# Patient Record
Sex: FEMALE | Race: BLACK OR AFRICAN AMERICAN | NOT HISPANIC OR LATINO | Employment: FULL TIME | URBAN - METROPOLITAN AREA
[De-identification: names, ages, dates, MRNs, and addresses within clinical notes are randomized per-mention and may not be internally consistent; named-entity substitution may affect disease eponyms.]

---

## 2017-11-13 PROBLEM — B00.9 HERPES SIMPLEX VIRUS (HSV) INFECTION: Status: ACTIVE | Noted: 2017-11-13

## 2018-06-20 PROBLEM — O99.820 GBS (GROUP B STREPTOCOCCUS CARRIER), +RV CULTURE, CURRENTLY PREGNANT: Status: ACTIVE | Noted: 2018-06-20

## 2019-01-14 PROBLEM — O09.899 SHORT INTERVAL BETWEEN PREGNANCIES AFFECTING PREGNANCY, ANTEPARTUM: Status: ACTIVE | Noted: 2019-01-14

## 2019-01-14 PROBLEM — O99.820 GBS (GROUP B STREPTOCOCCUS CARRIER), +RV CULTURE, CURRENTLY PREGNANT: Status: RESOLVED | Noted: 2018-06-20 | Resolved: 2019-01-14

## 2021-04-15 ENCOUNTER — PATIENT MESSAGE (OUTPATIENT)
Dept: RESEARCH | Facility: HOSPITAL | Age: 36
End: 2021-04-15

## 2022-12-20 ENCOUNTER — OFFICE VISIT (OUTPATIENT)
Dept: FAMILY MEDICINE | Facility: CLINIC | Age: 37
End: 2022-12-20
Payer: MEDICAID

## 2022-12-20 VITALS
BODY MASS INDEX: 36.49 KG/M2 | HEART RATE: 83 BPM | OXYGEN SATURATION: 98 % | HEIGHT: 65 IN | DIASTOLIC BLOOD PRESSURE: 76 MMHG | TEMPERATURE: 99 F | SYSTOLIC BLOOD PRESSURE: 118 MMHG | WEIGHT: 219 LBS

## 2022-12-20 DIAGNOSIS — R05.2 SUBACUTE COUGH: ICD-10-CM

## 2022-12-20 DIAGNOSIS — F32.A DEPRESSION, UNSPECIFIED DEPRESSION TYPE: ICD-10-CM

## 2022-12-20 DIAGNOSIS — J32.9 SINUSITIS, UNSPECIFIED CHRONICITY, UNSPECIFIED LOCATION: ICD-10-CM

## 2022-12-20 DIAGNOSIS — Z00.00 ROUTINE HEALTH MAINTENANCE: Primary | ICD-10-CM

## 2022-12-20 PROCEDURE — 3074F PR MOST RECENT SYSTOLIC BLOOD PRESSURE < 130 MM HG: ICD-10-PCS | Mod: CPTII,,, | Performed by: NURSE PRACTITIONER

## 2022-12-20 PROCEDURE — 99385 PR PREVENTIVE VISIT,NEW,18-39: ICD-10-PCS | Mod: S$PBB,,, | Performed by: NURSE PRACTITIONER

## 2022-12-20 PROCEDURE — 99204 OFFICE O/P NEW MOD 45 MIN: CPT | Performed by: NURSE PRACTITIONER

## 2022-12-20 PROCEDURE — 1159F PR MEDICATION LIST DOCUMENTED IN MEDICAL RECORD: ICD-10-PCS | Mod: CPTII,,, | Performed by: NURSE PRACTITIONER

## 2022-12-20 PROCEDURE — 3078F PR MOST RECENT DIASTOLIC BLOOD PRESSURE < 80 MM HG: ICD-10-PCS | Mod: CPTII,,, | Performed by: NURSE PRACTITIONER

## 2022-12-20 PROCEDURE — 3078F DIAST BP <80 MM HG: CPT | Mod: CPTII,,, | Performed by: NURSE PRACTITIONER

## 2022-12-20 PROCEDURE — 3008F BODY MASS INDEX DOCD: CPT | Mod: CPTII,,, | Performed by: NURSE PRACTITIONER

## 2022-12-20 PROCEDURE — 3074F SYST BP LT 130 MM HG: CPT | Mod: CPTII,,, | Performed by: NURSE PRACTITIONER

## 2022-12-20 PROCEDURE — 99385 PREV VISIT NEW AGE 18-39: CPT | Mod: S$PBB,,, | Performed by: NURSE PRACTITIONER

## 2022-12-20 PROCEDURE — 3008F PR BODY MASS INDEX (BMI) DOCUMENTED: ICD-10-PCS | Mod: CPTII,,, | Performed by: NURSE PRACTITIONER

## 2022-12-20 PROCEDURE — 1159F MED LIST DOCD IN RCRD: CPT | Mod: CPTII,,, | Performed by: NURSE PRACTITIONER

## 2022-12-20 RX ORDER — SERTRALINE HYDROCHLORIDE 50 MG/1
50 TABLET, FILM COATED ORAL
COMMUNITY
Start: 2022-08-11 | End: 2022-12-20 | Stop reason: SDUPTHER

## 2022-12-20 RX ORDER — BENZONATATE 100 MG/1
100 CAPSULE ORAL 3 TIMES DAILY PRN
Qty: 30 CAPSULE | Refills: 0 | Status: SHIPPED | OUTPATIENT
Start: 2022-12-20 | End: 2022-12-30

## 2022-12-20 RX ORDER — DOXYCYCLINE HYCLATE 100 MG
100 TABLET ORAL 2 TIMES DAILY
Qty: 14 TABLET | Refills: 0 | Status: SHIPPED | OUTPATIENT
Start: 2022-12-20 | End: 2022-12-27

## 2022-12-20 RX ORDER — SERTRALINE HYDROCHLORIDE 50 MG/1
50 TABLET, FILM COATED ORAL DAILY
Qty: 30 TABLET | Refills: 11 | Status: SHIPPED | OUTPATIENT
Start: 2022-12-20 | End: 2024-02-26 | Stop reason: SDUPTHER

## 2022-12-20 RX ORDER — PROMETHAZINE HYDROCHLORIDE AND DEXTROMETHORPHAN HYDROBROMIDE 6.25; 15 MG/5ML; MG/5ML
5 SYRUP ORAL EVERY 6 HOURS PRN
Qty: 200 ML | Refills: 0 | Status: SHIPPED | OUTPATIENT
Start: 2022-12-20 | End: 2022-12-30

## 2022-12-20 NOTE — PROGRESS NOTES
SUBJECTIVE:   HPI: Manan Alaniz  is a 37 y.o. female who presents for annual physical.    Ms. Alaniz presents today for annual. She is also here to establish care with me as her PCP. She states she doing well overall at this time but she wanted to have annual blood work done.  She has PMH of depression and HSV. She has 4 children with her youngest being 4 months old. She had pretty uneventful pregnancies. She extensive breast cancer history in family. She did breast feed all of her children but she is no longer breast feeding the baby due to recent illnesses and not wanting to breast feed her while taking antibiotics . She states she has a residual sinus infection after having Covid. She also has a dry lingering cough. She denies fever, chills or other infectious morbidity.     We spent some time reviewing overdue health maintenance. She has had a pap smear done this year that was able to be seen on Care everywhere as it was done in [facility] system.       No visits with results within 6 Month(s) from this visit.   Latest known visit with results is:   Routine Prenatal on 07/29/2019   Component Date Value Ref Range Status    Trichomonas 07/29/2019 NEGATIVE  NEGATIVE Final    Gardnerella vaginalis 07/29/2019 NEGATIVE  NEGATIVE Final    Candida 07/29/2019 POSITIVE (A)  NEGATIVE Final    Gonorrhea 07/29/2019 NEGATIVE  NEGATIVE Final    Chlamydia 07/29/2019 NEGATIVE  NEGATIVE Final    GBS 07/29/2019 NEGATIVE  NEGATIVE Final    Cocaine, Urine (POC) 07/29/2019 Negative  Negative, Indeterminate Final    THC (Marijuana), Urine (POC) 07/29/2019 Negative  Negative, Indeterminate Final    Methadone, Urine (POC) 07/29/2019 Negative  Negative, Indeterminate Final    Opiates, Urine (POC) 07/29/2019 Negative  Negative, Indeterminate Final    Oxycodone, Urine (POC) 07/29/2019 Negative  Negative, Indeterminate Final    MDMA (Ecstasy), Urine (POC) 07/29/2019 Negative  Negative, Indeterminate Final    WBC 07/29/2019 7.2  4.1 -  10.9 K/uL Final    Lymphs 07/29/2019 1.5  0.6 - 4.1 K/uL Final    MCV 07/29/2019 92.5  80.0 - 97.0 fL Final    RBC 07/29/2019 4.1 (L)  4.2 - 6.3 M/uL Final    Lymph % 07/29/2019 21.0  10.0 - 58.5 % Final    MCH 07/29/2019 27.1  26.0 - 32.0 pg Final    Hemoglobin 07/29/2019 11.1 (L)  12.0 - 18.0 g/dL Final    MID 07/29/2019 0.5  0.0 - 1.8 K/uL Final    MCHC 07/29/2019 29.3 (L)  31.0 - 36.0 g/dL Final    Hematocrit 07/29/2019 37.9  37.0 - 51.0 % Final    MID% 07/29/2019 6.7  0.1 - 24.0 % Final    RDW 07/29/2019 15.5 (H)  11.5 - 14.5 % Final    Platelets 07/29/2019 186.0  140.0 - 440.0 K/uL Final    Gran # (ANC) 07/29/2019 5.2  2.0 - 7.8 K/uL Final    Gran % 07/29/2019 72.3  37.0 - 92.0 % Final    MPV 07/29/2019 11.2  0.0 - 99.8 Final    HCV Ab 07/29/2019 NR  NR = NON-REACTIVE Final    Hepatitis B Surface Ag 07/29/2019 NR  NR = NON-REACTIVE Final    HIV-1/HIV-2 Ab 07/29/2019 NR  NON-REACTIVE Final    Syphilis Treponemal Ab 07/29/2019 NR  NON-REACTIVE Final         Current Outpatient Medications on File Prior to Visit   Medication Sig Dispense Refill    valACYclovir (VALTREX) 500 MG tablet Take 1 tablet (500 mg total) by mouth 2 (two) times daily. 60 tablet 5    [DISCONTINUED] sertraline (ZOLOFT) 50 MG tablet Take 50 mg by mouth.      prenatal vit103-iron fum-folic () 27 mg iron- 1 mg Tab Take 1 tablet by mouth once daily. 30 tablet 11    [DISCONTINUED] norethindrone (MICRONOR) 0.35 mg tablet Take 1 tablet (0.35 mg total) by mouth once daily. 30 tablet 1     No current facility-administered medications on file prior to visit.     Past Medical History:   Diagnosis Date    Herpes simplex virus (HSV) infection      @[email or web address]  Past Surgical History:   Procedure Laterality Date    DILATION AND CURETTAGE OF UTERUS       Family History   Problem Relation Age of Onset    Breast cancer Paternal Grandmother     Breast cancer Maternal Grandmother     Breast cancer Cousin     Colon cancer Neg Hx     Ovarian cancer Neg Hx         Marital Status:   Alcohol History:  reports no history of alcohol use.  Tobacco History:  reports that she has never smoked. She has never used smokeless tobacco.  Drug History:  reports no history of drug use.    Health Maintenance Topics with due status: Not Due       Topic Last Completion Date    TETANUS VACCINE 06/02/2022     Immunization History   Administered Date(s) Administered    COVID-19, MRNA, LN-S, PF (Pfizer) (Purple Cap) 03/28/2021, 04/18/2021, 01/18/2022    DTaP 08/22/1991    Hepatitis B, Pediatric/Adolescent 08/08/2000, 09/11/2003, 01/06/2006    IPV 08/22/1991, 01/06/2006    Influenza - Quadrivalent - PF *Preferred* (6 months and older) 11/21/2017    MMR 08/22/1991, 01/06/2006, 09/16/2015    Measles 08/01/1991    Meningococcal Conjugate (MCV4O) 09/16/2015    Tdap 09/16/2015, 06/18/2018, 06/24/2019, 06/02/2022       Review of patient's allergies indicates:  No Known Allergies    Current Outpatient Medications:     valACYclovir (VALTREX) 500 MG tablet, Take 1 tablet (500 mg total) by mouth 2 (two) times daily., Disp: 60 tablet, Rfl: 5    benzonatate (TESSALON) 100 MG capsule, Take 1 capsule (100 mg total) by mouth 3 (three) times daily as needed for Cough., Disp: 30 capsule, Rfl: 0    doxycycline (VIBRA-TABS) 100 MG tablet, Take 1 tablet (100 mg total) by mouth 2 (two) times daily. for 7 days, Disp: 14 tablet, Rfl: 0    prenatal vit103-iron fum-folic () 27 mg iron- 1 mg Tab, Take 1 tablet by mouth once daily., Disp: 30 tablet, Rfl: 11    promethazine-dextromethorphan (PROMETHAZINE-DM) 6.25-15 mg/5 mL Syrp, Take 5 mLs by mouth every 6 (six) hours as needed (Cough)., Disp: 200 mL, Rfl: 0    sertraline (ZOLOFT) 50 MG tablet, Take 1 tablet (50 mg total) by mouth once daily., Disp: 30 tablet, Rfl: 11    Review of Systems   Constitutional:  Negative for activity change, appetite change, fatigue, fever and unexpected weight change.   HENT:  Negative for congestion, hearing loss, mouth  "sores, nosebleeds, postnasal drip, rhinorrhea, sinus pressure, sinus pain, sneezing, sore throat and trouble swallowing.    Eyes:  Negative for pain, discharge, redness, itching and visual disturbance.   Respiratory:  Negative for chest tightness, shortness of breath and wheezing.    Cardiovascular:  Negative for chest pain and palpitations.   Gastrointestinal:  Negative for abdominal pain, blood in stool, constipation, diarrhea, nausea and vomiting.   Endocrine: Negative for cold intolerance, heat intolerance, polydipsia, polyphagia and polyuria.   Genitourinary:  Negative for difficulty urinating, dysuria, flank pain, frequency, genital sores, hematuria, menstrual problem, urgency, vaginal bleeding and vaginal discharge.   Musculoskeletal:  Negative for arthralgias, joint swelling, myalgias and neck pain.   Skin:  Negative for rash and wound.   Allergic/Immunologic: Negative for environmental allergies and food allergies.   Neurological:  Negative for dizziness, weakness, light-headedness and headaches.   Hematological:  Negative for adenopathy. Does not bruise/bleed easily.   Psychiatric/Behavioral:  Negative for agitation, confusion, dysphoric mood, hallucinations, self-injury and sleep disturbance. The patient is not nervous/anxious.     OBJECTIVE:      Vitals:    12/20/22 1430   BP: 118/76   BP Location: Right arm   Patient Position: Sitting   BP Method: Large (Manual)   Pulse: 83   Temp: 98.7 °F (37.1 °C)   TempSrc: Oral   SpO2: 98%   Weight: 99.3 kg (219 lb)   Height: 5' 5" (1.651 m)     Physical Exam  Vitals reviewed.   Constitutional:       General: She is not in acute distress.     Appearance: Normal appearance. She is obese. She is not ill-appearing, toxic-appearing or diaphoretic.      Comments: Patient's BMI 36.44 kg   HENT:      Head: Normocephalic and atraumatic.      Right Ear: Tympanic membrane and external ear normal. There is no impacted cerumen.      Left Ear: Tympanic membrane and external ear " normal. There is no impacted cerumen.      Nose: Nose normal. No congestion or rhinorrhea.      Mouth/Throat:      Mouth: Mucous membranes are moist.      Pharynx: Oropharynx is clear. No oropharyngeal exudate or posterior oropharyngeal erythema.   Eyes:      General: No scleral icterus.        Right eye: No discharge.         Left eye: No discharge.      Extraocular Movements: Extraocular movements intact.      Conjunctiva/sclera: Conjunctivae normal.      Pupils: Pupils are equal, round, and reactive to light.   Neck:      Vascular: No carotid bruit.   Cardiovascular:      Rate and Rhythm: Normal rate and regular rhythm.      Pulses: Normal pulses.      Heart sounds: Normal heart sounds. No murmur heard.    No friction rub. No gallop.   Pulmonary:      Effort: Pulmonary effort is normal. No respiratory distress.      Breath sounds: Normal breath sounds. No stridor. No rales.   Chest:      Chest wall: No tenderness.   Abdominal:      General: Abdomen is flat. Bowel sounds are normal.      Palpations: Abdomen is soft. There is no mass.      Tenderness: There is no abdominal tenderness. There is no guarding.   Musculoskeletal:         General: No swelling or signs of injury. Normal range of motion.      Cervical back: Normal range of motion and neck supple. No rigidity or tenderness.      Right lower leg: No edema.      Left lower leg: No edema.   Skin:     General: Skin is warm and dry.      Capillary Refill: Capillary refill takes less than 2 seconds.      Findings: No bruising, lesion or rash.   Neurological:      General: No focal deficit present.      Mental Status: She is alert and oriented to person, place, and time. Mental status is at baseline.      Motor: No weakness.      Coordination: Coordination normal.   Psychiatric:         Mood and Affect: Mood normal.         Behavior: Behavior normal.         Thought Content: Thought content normal.         Judgment: Judgment normal.      Assessment:       1.  Routine health maintenance    2. Subacute cough    3. Sinusitis, unspecified chronicity, unspecified location    4. Depression, unspecified depression type          Plan:       Routine health maintenance  -     CBC auto differential; Future; Expected date: 12/20/2022  -     Comprehensive Metabolic Panel; Future; Expected date: 12/20/2022  -     TSH; Future; Expected date: 12/20/2022  -     Vitamin D; Future; Expected date: 12/20/2022  -     Lipid Panel; Future; Expected date: 12/20/2022    Subacute cough  -     benzonatate (TESSALON) 100 MG capsule; Take 1 capsule (100 mg total) by mouth 3 (three) times daily as needed for Cough.  Dispense: 30 capsule; Refill: 0  -     X-Ray Chest PA And Lateral; Future; Expected date: 12/20/2022  -     promethazine-dextromethorphan (PROMETHAZINE-DM) 6.25-15 mg/5 mL Syrp; Take 5 mLs by mouth every 6 (six) hours as needed (Cough).  Dispense: 200 mL; Refill: 0    Sinusitis, unspecified chronicity, unspecified location  -     doxycycline (VIBRA-TABS) 100 MG tablet; Take 1 tablet (100 mg total) by mouth 2 (two) times daily. for 7 days  Dispense: 14 tablet; Refill: 0    Depression, unspecified depression type  -     sertraline (ZOLOFT) 50 MG tablet; Take 1 tablet (50 mg total) by mouth once daily.  Dispense: 30 tablet; Refill: 11        Counseled on age and gender appropriate medical preventative services, including cancer screenings, immunizations, overall nutritional health, need for a consistent exercise regimen and an overall push towards maintaining a vigorous and active lifestyle.      Follow up in about 1 year (around 12/20/2023), or if symptoms worsen or fail to improve, for Annual.          12/20/2022 Delfina Spain M.D.    Answers submitted by the patient for this visit:  Review of Systems Questionnaire (Submitted on 12/20/2022)  activity change: No  unexpected weight change: No  neck pain: No  hearing loss: No  rhinorrhea: No  trouble swallowing: No  eye discharge:  No  visual disturbance: No  chest tightness: No  wheezing: No  chest pain: No  palpitations: No  blood in stool: No  constipation: No  vomiting: No  diarrhea: No  polydipsia: No  polyuria: No  difficulty urinating: No  hematuria: No  menstrual problem: No  dysuria: No  joint swelling: No  arthralgias: No  headaches: No  weakness: No  confusion: No  dysphoric mood: No

## 2022-12-21 ENCOUNTER — HOSPITAL ENCOUNTER (OUTPATIENT)
Dept: RADIOLOGY | Facility: HOSPITAL | Age: 37
Discharge: HOME | End: 2022-12-21
Attending: NURSE PRACTITIONER
Payer: MEDICAID

## 2022-12-21 DIAGNOSIS — R05.2 SUBACUTE COUGH: ICD-10-CM

## 2022-12-21 PROCEDURE — 71046 X-RAY EXAM CHEST 2 VIEWS: CPT | Mod: TC,PO

## 2023-02-12 ENCOUNTER — PATIENT MESSAGE (OUTPATIENT)
Dept: FAMILY MEDICINE | Facility: CLINIC | Age: 38
End: 2023-02-12

## 2023-03-10 ENCOUNTER — LAB VISIT (OUTPATIENT)
Dept: LAB | Facility: HOSPITAL | Age: 38
End: 2023-03-10
Attending: NURSE PRACTITIONER
Payer: MEDICAID

## 2023-03-10 ENCOUNTER — OFFICE VISIT (OUTPATIENT)
Dept: FAMILY MEDICINE | Facility: CLINIC | Age: 38
End: 2023-03-10
Payer: MEDICAID

## 2023-03-10 VITALS
HEART RATE: 80 BPM | BODY MASS INDEX: 37.85 KG/M2 | RESPIRATION RATE: 20 BRPM | SYSTOLIC BLOOD PRESSURE: 120 MMHG | TEMPERATURE: 98 F | DIASTOLIC BLOOD PRESSURE: 82 MMHG | WEIGHT: 227.19 LBS | HEIGHT: 65 IN

## 2023-03-10 DIAGNOSIS — Z13.1 DIABETES MELLITUS SCREENING: ICD-10-CM

## 2023-03-10 DIAGNOSIS — E55.9 VITAMIN D DEFICIENCY: Primary | ICD-10-CM

## 2023-03-10 LAB
ESTIMATED AVG GLUCOSE: 128 MG/DL (ref 68–131)
HBA1C MFR BLD: 6.1 % (ref 4.5–6.2)

## 2023-03-10 PROCEDURE — 3074F SYST BP LT 130 MM HG: CPT | Mod: CPTII,,, | Performed by: NURSE PRACTITIONER

## 2023-03-10 PROCEDURE — 3079F PR MOST RECENT DIASTOLIC BLOOD PRESSURE 80-89 MM HG: ICD-10-PCS | Mod: CPTII,,, | Performed by: NURSE PRACTITIONER

## 2023-03-10 PROCEDURE — 36415 COLL VENOUS BLD VENIPUNCTURE: CPT | Performed by: NURSE PRACTITIONER

## 2023-03-10 PROCEDURE — 83036 HEMOGLOBIN GLYCOSYLATED A1C: CPT | Performed by: NURSE PRACTITIONER

## 2023-03-10 PROCEDURE — 1159F PR MEDICATION LIST DOCUMENTED IN MEDICAL RECORD: ICD-10-PCS | Mod: CPTII,,, | Performed by: NURSE PRACTITIONER

## 2023-03-10 PROCEDURE — 3008F BODY MASS INDEX DOCD: CPT | Mod: CPTII,,, | Performed by: NURSE PRACTITIONER

## 2023-03-10 PROCEDURE — 99213 PR OFFICE/OUTPT VISIT, EST, LEVL III, 20-29 MIN: ICD-10-PCS | Mod: S$PBB,,, | Performed by: NURSE PRACTITIONER

## 2023-03-10 PROCEDURE — 3008F PR BODY MASS INDEX (BMI) DOCUMENTED: ICD-10-PCS | Mod: CPTII,,, | Performed by: NURSE PRACTITIONER

## 2023-03-10 PROCEDURE — 3074F PR MOST RECENT SYSTOLIC BLOOD PRESSURE < 130 MM HG: ICD-10-PCS | Mod: CPTII,,, | Performed by: NURSE PRACTITIONER

## 2023-03-10 PROCEDURE — 1159F MED LIST DOCD IN RCRD: CPT | Mod: CPTII,,, | Performed by: NURSE PRACTITIONER

## 2023-03-10 PROCEDURE — 3079F DIAST BP 80-89 MM HG: CPT | Mod: CPTII,,, | Performed by: NURSE PRACTITIONER

## 2023-03-10 PROCEDURE — 99213 OFFICE O/P EST LOW 20 MIN: CPT | Performed by: NURSE PRACTITIONER

## 2023-03-10 PROCEDURE — 99213 OFFICE O/P EST LOW 20 MIN: CPT | Mod: S$PBB,,, | Performed by: NURSE PRACTITIONER

## 2023-03-10 RX ORDER — NITROFURANTOIN 25; 75 MG/1; MG/1
100 CAPSULE ORAL EVERY 12 HOURS
COMMUNITY
Start: 2023-02-20 | End: 2023-03-10 | Stop reason: ALTCHOICE

## 2023-03-10 RX ORDER — ERGOCALCIFEROL 1.25 MG/1
50000 CAPSULE ORAL
Qty: 4 CAPSULE | Refills: 2 | Status: SHIPPED | OUTPATIENT
Start: 2023-03-10 | End: 2023-06-13

## 2023-03-10 RX ORDER — PHENAZOPYRIDINE HYDROCHLORIDE 200 MG/1
200 TABLET, FILM COATED ORAL 3 TIMES DAILY
COMMUNITY
Start: 2023-02-20 | End: 2023-03-10 | Stop reason: ALTCHOICE

## 2023-03-10 NOTE — PROGRESS NOTES
Patient ID: Manan Alaniz is a 37 y.o. female.    Chief Complaint: Obesity (36 yo female here to consult about weight loss. KM)    Ms. Alaniz presents today to discuss weight management. She also wanted to discuss blood work for lab review. She is doing well overall at the time of this visit. She states she has had recent weight gain and after adjusting diet and exercise it has been difficult to lose. She states several family and friends have told her about Ozempic and she wanted to see if she was eligible.    She denies any other issues or complaints.       Past Medical History:   Diagnosis Date    Herpes simplex virus (HSV) infection      Past Surgical History:   Procedure Laterality Date    DILATION AND CURETTAGE OF UTERUS           Tobacco History:  reports that she has never smoked. She has never used smokeless tobacco.      Review of patient's allergies indicates:  No Known Allergies    Current Outpatient Medications:     sertraline (ZOLOFT) 50 MG tablet, Take 1 tablet (50 mg total) by mouth once daily., Disp: 30 tablet, Rfl: 11    valACYclovir (VALTREX) 500 MG tablet, Take 1 tablet (500 mg total) by mouth 2 (two) times daily., Disp: 60 tablet, Rfl: 5    ergocalciferol (ERGOCALCIFEROL) 50,000 unit Cap, Take 1 capsule (50,000 Units total) by mouth every 7 days., Disp: 4 capsule, Rfl: 2    nitrofurantoin, macrocrystal-monohydrate, (MACROBID) 100 MG capsule, Take 100 mg by mouth every 12 (twelve) hours., Disp: , Rfl:     phenazopyridine (PYRIDIUM) 200 MG tablet, Take 200 mg by mouth 3 (three) times daily., Disp: , Rfl:     prenatal vit103-iron fum-folic () 27 mg iron- 1 mg Tab, Take 1 tablet by mouth once daily., Disp: 30 tablet, Rfl: 11    Review of Systems   Constitutional:  Positive for activity change and unexpected weight change. Negative for appetite change, fatigue and fever.   HENT:  Negative for congestion, hearing loss, mouth sores, nosebleeds, postnasal drip, rhinorrhea, sinus pressure,  "sinus pain, sneezing, sore throat and trouble swallowing.    Eyes:  Negative for pain, discharge, redness, itching and visual disturbance.   Respiratory:  Negative for chest tightness, shortness of breath and wheezing.    Cardiovascular:  Negative for chest pain and palpitations.   Gastrointestinal:  Negative for abdominal pain, blood in stool, constipation, diarrhea, nausea and vomiting.   Endocrine: Negative for cold intolerance, heat intolerance, polydipsia, polyphagia and polyuria.   Genitourinary:  Negative for difficulty urinating, dysuria, flank pain, frequency, genital sores, hematuria, menstrual problem, urgency, vaginal bleeding and vaginal discharge.   Musculoskeletal:  Negative for arthralgias, joint swelling, myalgias and neck pain.   Skin:  Negative for rash and wound.   Allergic/Immunologic: Negative for environmental allergies and food allergies.   Neurological:  Negative for dizziness, weakness, light-headedness and headaches.   Hematological:  Negative for adenopathy. Does not bruise/bleed easily.   Psychiatric/Behavioral:  Negative for agitation, confusion, dysphoric mood, hallucinations, self-injury and sleep disturbance. The patient is not nervous/anxious.         Objective:      Vitals:    03/10/23 1016   BP: 120/82   Pulse: 80   Resp: 20   Temp: 97.9 °F (36.6 °C)   TempSrc: Oral   Weight: 103.1 kg (227 lb 3.2 oz)   Height: 5' 5" (1.651 m)     Physical Exam  Vitals reviewed.   Constitutional:       General: She is not in acute distress.     Appearance: Normal appearance. She is obese. She is not ill-appearing, toxic-appearing or diaphoretic.   HENT:      Head: Normocephalic and atraumatic.      Right Ear: Tympanic membrane and external ear normal. There is no impacted cerumen.      Left Ear: Tympanic membrane and external ear normal. There is no impacted cerumen.      Nose: Nose normal. No congestion or rhinorrhea.      Mouth/Throat:      Mouth: Mucous membranes are moist.      Pharynx: " Oropharynx is clear. No oropharyngeal exudate or posterior oropharyngeal erythema.   Eyes:      General: No scleral icterus.        Right eye: No discharge.         Left eye: No discharge.      Extraocular Movements: Extraocular movements intact.      Conjunctiva/sclera: Conjunctivae normal.      Pupils: Pupils are equal, round, and reactive to light.   Neck:      Vascular: No carotid bruit.   Cardiovascular:      Rate and Rhythm: Normal rate and regular rhythm.      Pulses: Normal pulses.      Heart sounds: Normal heart sounds. No murmur heard.    No friction rub. No gallop.   Pulmonary:      Effort: Pulmonary effort is normal. No respiratory distress.      Breath sounds: Normal breath sounds. No stridor. No rales.   Chest:      Chest wall: No tenderness.   Abdominal:      General: Abdomen is flat. Bowel sounds are normal.      Palpations: Abdomen is soft. There is no mass.      Tenderness: There is no abdominal tenderness. There is no guarding.   Musculoskeletal:         General: No swelling or signs of injury. Normal range of motion.      Cervical back: Normal range of motion and neck supple. No rigidity or tenderness.      Right lower leg: No edema.      Left lower leg: No edema.   Skin:     General: Skin is warm and dry.      Capillary Refill: Capillary refill takes less than 2 seconds.      Findings: No bruising, lesion or rash.   Neurological:      General: No focal deficit present.      Mental Status: She is alert and oriented to person, place, and time. Mental status is at baseline.      Motor: No weakness.      Coordination: Coordination normal.   Psychiatric:         Mood and Affect: Mood normal.         Behavior: Behavior normal.         Thought Content: Thought content normal.         Judgment: Judgment normal.         Assessment:       1. Vitamin D deficiency    2. Diabetes mellitus screening           Plan:       Vitamin D deficiency  -     ergocalciferol (ERGOCALCIFEROL) 50,000 unit Cap; Take 1  capsule (50,000 Units total) by mouth every 7 days.  Dispense: 4 capsule; Refill: 2    Diabetes mellitus screening  -     Hemoglobin A1C; Future; Expected date: 03/10/2023      Follow up if symptoms worsen or fail to improve.      She was informed that Nurse Practitioners in the [facility] are not currently allowed to prescribe for weight loss. She will follow up with Dr. Uriel Mtz at next available appointment time.     3/10/2023 Delfina Spain NP

## 2023-06-13 DIAGNOSIS — E55.9 VITAMIN D DEFICIENCY: ICD-10-CM

## 2023-06-13 RX ORDER — ERGOCALCIFEROL 1.25 MG/1
CAPSULE ORAL
Qty: 4 CAPSULE | Refills: 2 | Status: SHIPPED | OUTPATIENT
Start: 2023-06-13

## 2023-06-19 ENCOUNTER — PATIENT MESSAGE (OUTPATIENT)
Dept: FAMILY MEDICINE | Facility: CLINIC | Age: 38
End: 2023-06-19

## 2023-07-03 ENCOUNTER — OFFICE VISIT (OUTPATIENT)
Dept: FAMILY MEDICINE | Facility: CLINIC | Age: 38
End: 2023-07-03
Payer: MEDICAID

## 2023-07-03 VITALS
DIASTOLIC BLOOD PRESSURE: 70 MMHG | HEART RATE: 84 BPM | BODY MASS INDEX: 37.82 KG/M2 | OXYGEN SATURATION: 96 % | SYSTOLIC BLOOD PRESSURE: 110 MMHG | RESPIRATION RATE: 18 BRPM | HEIGHT: 65 IN | WEIGHT: 227 LBS

## 2023-07-03 DIAGNOSIS — Z76.89 ENCOUNTER FOR WEIGHT MANAGEMENT: ICD-10-CM

## 2023-07-03 DIAGNOSIS — E66.09 CLASS 2 OBESITY DUE TO EXCESS CALORIES WITHOUT SERIOUS COMORBIDITY WITH BODY MASS INDEX (BMI) OF 37.0 TO 37.9 IN ADULT: Primary | ICD-10-CM

## 2023-07-03 DIAGNOSIS — R73.03 PREDIABETES: ICD-10-CM

## 2023-07-03 DIAGNOSIS — Z71.3 DIETARY COUNSELING AND SURVEILLANCE: ICD-10-CM

## 2023-07-03 PROBLEM — E66.812 CLASS 2 OBESITY DUE TO EXCESS CALORIES WITHOUT SERIOUS COMORBIDITY WITH BODY MASS INDEX (BMI) OF 37.0 TO 37.9 IN ADULT: Status: ACTIVE | Noted: 2023-07-03

## 2023-07-03 PROBLEM — O09.899 SHORT INTERVAL BETWEEN PREGNANCIES AFFECTING PREGNANCY, ANTEPARTUM: Status: RESOLVED | Noted: 2019-01-14 | Resolved: 2023-07-03

## 2023-07-03 PROCEDURE — 3008F PR BODY MASS INDEX (BMI) DOCUMENTED: ICD-10-PCS | Mod: CPTII,,, | Performed by: FAMILY MEDICINE

## 2023-07-03 PROCEDURE — 3044F PR MOST RECENT HEMOGLOBIN A1C LEVEL <7.0%: ICD-10-PCS | Mod: CPTII,,, | Performed by: FAMILY MEDICINE

## 2023-07-03 PROCEDURE — 3078F DIAST BP <80 MM HG: CPT | Mod: CPTII,,, | Performed by: FAMILY MEDICINE

## 2023-07-03 PROCEDURE — 3044F HG A1C LEVEL LT 7.0%: CPT | Mod: CPTII,,, | Performed by: FAMILY MEDICINE

## 2023-07-03 PROCEDURE — 99214 OFFICE O/P EST MOD 30 MIN: CPT | Mod: S$PBB,,, | Performed by: FAMILY MEDICINE

## 2023-07-03 PROCEDURE — 1159F PR MEDICATION LIST DOCUMENTED IN MEDICAL RECORD: ICD-10-PCS | Mod: CPTII,,, | Performed by: FAMILY MEDICINE

## 2023-07-03 PROCEDURE — 99214 OFFICE O/P EST MOD 30 MIN: CPT | Performed by: FAMILY MEDICINE

## 2023-07-03 PROCEDURE — 3074F SYST BP LT 130 MM HG: CPT | Mod: CPTII,,, | Performed by: FAMILY MEDICINE

## 2023-07-03 PROCEDURE — 1159F MED LIST DOCD IN RCRD: CPT | Mod: CPTII,,, | Performed by: FAMILY MEDICINE

## 2023-07-03 PROCEDURE — 99214 PR OFFICE/OUTPT VISIT, EST, LEVL IV, 30-39 MIN: ICD-10-PCS | Mod: S$PBB,,, | Performed by: FAMILY MEDICINE

## 2023-07-03 PROCEDURE — 3074F PR MOST RECENT SYSTOLIC BLOOD PRESSURE < 130 MM HG: ICD-10-PCS | Mod: CPTII,,, | Performed by: FAMILY MEDICINE

## 2023-07-03 PROCEDURE — 3008F BODY MASS INDEX DOCD: CPT | Mod: CPTII,,, | Performed by: FAMILY MEDICINE

## 2023-07-03 PROCEDURE — 3078F PR MOST RECENT DIASTOLIC BLOOD PRESSURE < 80 MM HG: ICD-10-PCS | Mod: CPTII,,, | Performed by: FAMILY MEDICINE

## 2023-07-03 RX ORDER — PHENTERMINE HYDROCHLORIDE 37.5 MG/1
18.75 TABLET ORAL
Qty: 15 TABLET | Refills: 1 | Status: SHIPPED | OUTPATIENT
Start: 2023-07-03

## 2023-07-03 RX ORDER — PHENTERMINE HYDROCHLORIDE 37.5 MG/1
18.75 TABLET ORAL
Qty: 15 TABLET | Refills: 1 | Status: SHIPPED | OUTPATIENT
Start: 2023-07-03 | End: 2023-07-03

## 2023-07-03 NOTE — PROGRESS NOTES
SUBJECTIVE:    Patient ID: Manan Alaniz is a 38 y.o. female.    Chief Complaint: Obesity    HPI    Manan Alaniz is a 38 y.o. F patient of Delfina Spain NP's with a history of Obesity who comes in to discuss weight management. Past medical history is notable for Depression.    Labs: A1c 6.1%, TSH 1.39, TG 39, eGFR >60    Obesogenic meds: ?Zoloft    The patient has struggled with weight issues since her first pregnancy 16+ years ago. She also gained a significant amount of weight while on Depo Provera. In 2015, she successfully got down to 176 lb while using Adipex, but regained it (and more) after stopping the intervention. At other times, has lost inches off her waist while working out with a , but also regained. Her max weight is 227 lb (today).       Dietary habits include   B: does not eat breakfast  S: none  L: Nutrigrain bar around 12 o'clock, picks on other foods as she prepares lunch for her kids  S: popcorn, fruit, chips  D: pot roast, potatoes, celery, and carrots or air-fried chicken thighs, tellez wrapped asparagus, mashed potatoes  S: popcorn, ice cream  O/n: none  SSB: Coke (2-3 per day), cran-grape or apple juice    Exercise habits include: no dedicated time.     History of uncontrolled hypertension? no  History of cardiovascular disease? No   History of glaucoma? no  History of eating disorder? no  History of substance use? Etoh on weekends, 2 drinks or so  History of cholelithiasis? no  History of pancreatitis? no  Personal or family history of Medullary Thyroid Cancer or MEN2? no      ROS:  Denies fevers, chills, or unintentional weight changes.  Denies polydipsia, polyuria, vision changes, or paresthesias  Denies temperature intolerance, palpitations or skipped heartbeats, diarrhea/constipation, or mood changes.    Review of patient's allergies indicates:  No Known Allergies    Current Outpatient Medications:     ergocalciferol (ERGOCALCIFEROL) 50,000 unit Cap, TAKE 1  "CAPSULE BY MOUTH EVERY 7 DAYS, Disp: 4 capsule, Rfl: 2    sertraline (ZOLOFT) 50 MG tablet, Take 1 tablet (50 mg total) by mouth once daily., Disp: 30 tablet, Rfl: 11    valACYclovir (VALTREX) 500 MG tablet, Take 1 tablet (500 mg total) by mouth 2 (two) times daily., Disp: 60 tablet, Rfl: 5    phentermine (ADIPEX-P) 37.5 mg tablet, Take 0.5 tablets (18.75 mg total) by mouth before breakfast., Disp: 15 tablet, Rfl: 1    prenatal vit103-iron fum-folic () 27 mg iron- 1 mg Tab, Take 1 tablet by mouth once daily., Disp: 30 tablet, Rfl: 11           Objective:      Vitals:    07/03/23 1107   BP: 110/70   Pulse: 84   Resp: 18   SpO2: 96%   Weight: 103 kg (227 lb)   Height: 5' 5" (1.651 m)       Physical Exam  Vitals reviewed.   Constitutional:       General: She is not in acute distress.     Appearance: She is obese. She is not ill-appearing.   Cardiovascular:      Rate and Rhythm: Normal rate and regular rhythm.      Pulses: Normal pulses.      Heart sounds: Normal heart sounds.   Pulmonary:      Effort: Pulmonary effort is normal.      Breath sounds: Normal breath sounds.   Skin:     General: Skin is warm and dry.   Neurological:      Mental Status: She is alert and oriented to person, place, and time.   Psychiatric:         Mood and Affect: Mood normal.         Behavior: Behavior normal.        No visits with results within 1 Month(s) from this visit.   Latest known visit with results is:   Lab Visit on 03/10/2023   Component Date Value    Hemoglobin A1C 03/10/2023 6.1     Estimated Avg Glucose 03/10/2023 128               Assessment & Plan:       1. Class 2 obesity due to excess calories without serious comorbidity with body mass index (BMI) of 37.0 to 37.9 in adult    2. Encounter for weight management    3. Dietary counseling and surveillance    4. Prediabetes        Clinical picture consistent with Obesity due to excessive caloric intake, Class 2. Patient does not meet criteria for bariatric surgery (age " 18-63yo and BMI greater than 40, or greater than 35 with an obesity related health condition). Medication options discussed include Wegovy, Phentermine, Topiramate, Contrave, Xenical, and Plenity. Pt is a candidate for all AOM except Contrave given her EtOH use. Though a good candidate for Wegovy, her insurance does not cover and it is cost-prohibitive. Decision to start Phentermine at 18.75mg per day with room for uptitration and/or augmentation with Topiramate prn. May also consider adding Metformin in context of prediabetes during future OV.    Goal weight loss for next visit: 0.5-1 lb per week, with an overall short-term goal of 5-10% loss from initial weight.    Strategies/recommendations discussed with patient include keeping a written diary that tracks all meals, snacks, and beverages, as well as working toward a goal of exercising 150 minutes of moderate aerobic activity or 75 minutes of vigorous aerobic activity per week. Meal planning handout given to guide more healthful choices.      No follow-ups on file.        7/3/2023 Venus Nice M.D.

## 2023-07-03 NOTE — PATIENT INSTRUCTIONS
"Start keeping a written diary that tracks all meals, snacks, and beverages that you consume.  Begin slowly working toward a goal of exercising 150 minutes of moderate aerobic activity or 75 minutes of vigorous aerobic activity per week.   Use the handout "Mediterranean Diet" to help guide more healthful food choices.  Stay hydrated! Drink enough water per day so that your urine runs almost clear.   Take a multivitamin daily.    "

## 2023-07-31 ENCOUNTER — PATIENT MESSAGE (OUTPATIENT)
Dept: RESEARCH | Facility: HOSPITAL | Age: 38
End: 2023-07-31
Payer: MEDICAID

## 2023-08-16 ENCOUNTER — TELEPHONE (OUTPATIENT)
Dept: FAMILY MEDICINE | Facility: CLINIC | Age: 38
End: 2023-08-16

## 2023-12-20 ENCOUNTER — OFFICE VISIT (OUTPATIENT)
Dept: FAMILY MEDICINE | Facility: CLINIC | Age: 38
End: 2023-12-20
Payer: MEDICAID

## 2023-12-20 VITALS
DIASTOLIC BLOOD PRESSURE: 78 MMHG | TEMPERATURE: 98 F | HEART RATE: 100 BPM | RESPIRATION RATE: 20 BRPM | WEIGHT: 225.31 LBS | BODY MASS INDEX: 37.54 KG/M2 | HEIGHT: 65 IN | SYSTOLIC BLOOD PRESSURE: 128 MMHG

## 2023-12-20 DIAGNOSIS — N63.11 MASS OF UPPER OUTER QUADRANT OF RIGHT BREAST: ICD-10-CM

## 2023-12-20 DIAGNOSIS — Z23 NEED FOR VACCINATION: ICD-10-CM

## 2023-12-20 DIAGNOSIS — E55.9 VITAMIN D DEFICIENCY: ICD-10-CM

## 2023-12-20 DIAGNOSIS — R73.03 PREDIABETES: Primary | ICD-10-CM

## 2023-12-20 DIAGNOSIS — E66.09 CLASS 2 OBESITY DUE TO EXCESS CALORIES WITHOUT SERIOUS COMORBIDITY WITH BODY MASS INDEX (BMI) OF 37.0 TO 37.9 IN ADULT: ICD-10-CM

## 2023-12-20 PROCEDURE — 1159F PR MEDICATION LIST DOCUMENTED IN MEDICAL RECORD: ICD-10-PCS | Mod: CPTII,,, | Performed by: NURSE PRACTITIONER

## 2023-12-20 PROCEDURE — 1159F MED LIST DOCD IN RCRD: CPT | Mod: CPTII,,, | Performed by: NURSE PRACTITIONER

## 2023-12-20 PROCEDURE — 99213 OFFICE O/P EST LOW 20 MIN: CPT | Mod: 25,,, | Performed by: NURSE PRACTITIONER

## 2023-12-20 PROCEDURE — 3074F PR MOST RECENT SYSTOLIC BLOOD PRESSURE < 130 MM HG: ICD-10-PCS | Mod: CPTII,,, | Performed by: NURSE PRACTITIONER

## 2023-12-20 PROCEDURE — 3008F PR BODY MASS INDEX (BMI) DOCUMENTED: ICD-10-PCS | Mod: CPTII,,, | Performed by: NURSE PRACTITIONER

## 2023-12-20 PROCEDURE — 3078F DIAST BP <80 MM HG: CPT | Mod: CPTII,,, | Performed by: NURSE PRACTITIONER

## 2023-12-20 PROCEDURE — 3074F SYST BP LT 130 MM HG: CPT | Mod: CPTII,,, | Performed by: NURSE PRACTITIONER

## 2023-12-20 PROCEDURE — 3008F BODY MASS INDEX DOCD: CPT | Mod: CPTII,,, | Performed by: NURSE PRACTITIONER

## 2023-12-20 PROCEDURE — 90471 FLU VACCINE (QUAD) GREATER THAN OR EQUAL TO 3YO PRESERVATIVE FREE IM: ICD-10-PCS | Mod: ,,, | Performed by: NURSE PRACTITIONER

## 2023-12-20 PROCEDURE — 90686 IIV4 VACC NO PRSV 0.5 ML IM: CPT | Mod: ,,, | Performed by: NURSE PRACTITIONER

## 2023-12-20 PROCEDURE — 90686 FLU VACCINE (QUAD) GREATER THAN OR EQUAL TO 3YO PRESERVATIVE FREE IM: ICD-10-PCS | Mod: ,,, | Performed by: NURSE PRACTITIONER

## 2023-12-20 PROCEDURE — 3044F PR MOST RECENT HEMOGLOBIN A1C LEVEL <7.0%: ICD-10-PCS | Mod: CPTII,,, | Performed by: NURSE PRACTITIONER

## 2023-12-20 PROCEDURE — 3044F HG A1C LEVEL LT 7.0%: CPT | Mod: CPTII,,, | Performed by: NURSE PRACTITIONER

## 2023-12-20 PROCEDURE — 99213 PR OFFICE/OUTPT VISIT, EST, LEVL III, 20-29 MIN: ICD-10-PCS | Mod: 25,,, | Performed by: NURSE PRACTITIONER

## 2023-12-20 PROCEDURE — 90471 IMMUNIZATION ADMIN: CPT | Mod: ,,, | Performed by: NURSE PRACTITIONER

## 2023-12-20 PROCEDURE — 3078F PR MOST RECENT DIASTOLIC BLOOD PRESSURE < 80 MM HG: ICD-10-PCS | Mod: CPTII,,, | Performed by: NURSE PRACTITIONER

## 2023-12-20 RX ORDER — AMOXICILLIN AND CLAVULANATE POTASSIUM 875; 125 MG/1; MG/1
1 TABLET, FILM COATED ORAL 2 TIMES DAILY
COMMUNITY
Start: 2023-12-18

## 2023-12-20 RX ORDER — MUPIROCIN 20 MG/G
OINTMENT TOPICAL
COMMUNITY
Start: 2023-12-18

## 2023-12-20 NOTE — PROGRESS NOTES
Patient ID: Manan Alaniz is a 38 y.o. female.    Chief Complaint: Annual Exam (37 yo female here for annual check up. Pt states she was eval/tx at Urgent Care Tuesday and dx with infected middle finger. Pt states WNL symptoms since eval but experienced uncontrolled BM today. KM/)    Ms. Alaniz presents today for follow  up. She states she is doing well overall but she has been experiencing what appears to be a small mass in her right breast that comes and goes and is at times painful. She denies fever or chills so it does not appear to be cystic or infectious. She does not feel the mass now but she is very concerned and would like to have  this evaluated. She is also due to have blood work done. She denies any other issues or complaints.         Past Medical History:   Diagnosis Date    Herpes simplex virus (HSV) infection      Past Surgical History:   Procedure Laterality Date    DILATION AND CURETTAGE OF UTERUS           Tobacco History:  reports that she has never smoked. She has never been exposed to tobacco smoke. She has never used smokeless tobacco.      Review of patient's allergies indicates:  No Known Allergies    Current Outpatient Medications:     amoxicillin-clavulanate 875-125mg (AUGMENTIN) 875-125 mg per tablet, Take 1 tablet by mouth 2 (two) times daily., Disp: , Rfl:     mupirocin (BACTROBAN) 2 % ointment, SMARTSI Application Topical 2-3 Times Daily, Disp: , Rfl:     ergocalciferol (ERGOCALCIFEROL) 50,000 unit Cap, TAKE 1 CAPSULE BY MOUTH EVERY 7 DAYS (Patient not taking: Reported on 2023), Disp: 4 capsule, Rfl: 2    phentermine (ADIPEX-P) 37.5 mg tablet, Take 0.5 tablets (18.75 mg total) by mouth before breakfast. (Patient not taking: Reported on 2023), Disp: 15 tablet, Rfl: 1    prenatal vit103-iron fum-folic () 27 mg iron- 1 mg Tab, Take 1 tablet by mouth once daily., Disp: 30 tablet, Rfl: 11    sertraline (ZOLOFT) 50 MG tablet, Take 1 tablet (50 mg total) by  "mouth once daily. (Patient not taking: Reported on 12/20/2023), Disp: 30 tablet, Rfl: 11    valACYclovir (VALTREX) 500 MG tablet, Take 1 tablet (500 mg total) by mouth 2 (two) times daily. (Patient not taking: Reported on 12/20/2023), Disp: 60 tablet, Rfl: 5    Review of Systems   Constitutional:  Positive for fatigue.   Musculoskeletal:  Positive for arthralgias.   Psychiatric/Behavioral:  The patient is nervous/anxious.           Objective:      Vitals:    12/20/23 1423   BP: 128/78   Pulse: 100   Resp: 20   Temp: 98.2 °F (36.8 °C)   TempSrc: Oral   Weight: 102.2 kg (225 lb 4.8 oz)   Height: 5' 5" (1.651 m)     Physical Exam  Constitutional:       Appearance: She is obese.   HENT:      Mouth/Throat:      Mouth: Mucous membranes are moist.      Pharynx: Oropharynx is clear.   Cardiovascular:      Rate and Rhythm: Normal rate and regular rhythm.      Heart sounds: Normal heart sounds.   Pulmonary:      Effort: Pulmonary effort is normal.      Breath sounds: Normal breath sounds.   Neurological:      Mental Status: She is alert and oriented to person, place, and time.   Psychiatric:         Mood and Affect: Mood normal.         Behavior: Behavior normal.         Thought Content: Thought content normal.         Judgment: Judgment normal.           Assessment:       1. Prediabetes    2. Vitamin D deficiency    3. Class 2 obesity due to excess calories without serious comorbidity with body mass index (BMI) of 37.0 to 37.9 in adult    4. Mass of upper outer quadrant of right breast    5. Need for vaccination           Plan:       Prediabetes  -     HEMOGLOBIN A1C; Future; Expected date: 12/20/2023    Vitamin D deficiency    Class 2 obesity due to excess calories without serious comorbidity with body mass index (BMI) of 37.0 to 37.9 in adult  -     COMPREHENSIVE METABOLIC PANEL; Future; Expected date: 12/20/2023  -     TSH; Future; Expected date: 12/20/2023    Mass of upper outer quadrant of right breast  -     Mammo " Digital Diagnostic Right with Vasu; Future; Expected date: 12/20/2023  -     US Breast Right Limited; Future; Expected date: 12/20/2023    Need for vaccination  -     Influenza - Quadrivalent (PF)      Follow up if symptoms worsen or fail to improve.        12/20/2023 Delfina Spain, NP

## 2023-12-21 LAB
ALBUMIN SERPL-MCNC: 4.1 G/DL (ref 3.6–5.1)
ALBUMIN/GLOB SERPL: 1.2 (CALC) (ref 1–2.5)
ALP SERPL-CCNC: 93 U/L (ref 31–125)
ALT SERPL-CCNC: 14 U/L (ref 6–29)
AST SERPL-CCNC: 15 U/L (ref 10–30)
BILIRUB SERPL-MCNC: 0.7 MG/DL (ref 0.2–1.2)
BUN SERPL-MCNC: 8 MG/DL (ref 7–25)
BUN/CREAT SERPL: NORMAL (CALC) (ref 6–22)
CALCIUM SERPL-MCNC: 9.5 MG/DL (ref 8.6–10.2)
CHLORIDE SERPL-SCNC: 103 MMOL/L (ref 98–110)
CO2 SERPL-SCNC: 29 MMOL/L (ref 20–32)
CREAT SERPL-MCNC: 0.78 MG/DL (ref 0.5–0.97)
EGFR: 100 ML/MIN/1.73M2
GLOBULIN SER CALC-MCNC: 3.5 G/DL (CALC) (ref 1.9–3.7)
GLUCOSE SERPL-MCNC: 97 MG/DL (ref 65–139)
HBA1C MFR BLD: 5.9 % OF TOTAL HGB
POTASSIUM SERPL-SCNC: 3.8 MMOL/L (ref 3.5–5.3)
PROT SERPL-MCNC: 7.6 G/DL (ref 6.1–8.1)
SODIUM SERPL-SCNC: 140 MMOL/L (ref 135–146)
TSH SERPL-ACNC: 1.34 MIU/L

## 2024-02-26 DIAGNOSIS — F32.A DEPRESSION, UNSPECIFIED DEPRESSION TYPE: ICD-10-CM

## 2024-02-27 RX ORDER — SERTRALINE HYDROCHLORIDE 50 MG/1
50 TABLET, FILM COATED ORAL DAILY
Qty: 30 TABLET | Refills: 11 | Status: SHIPPED | OUTPATIENT
Start: 2024-02-27 | End: 2025-02-26

## 2024-03-11 ENCOUNTER — HOSPITAL ENCOUNTER (OUTPATIENT)
Dept: RADIOLOGY | Facility: HOSPITAL | Age: 39
Discharge: HOME | End: 2024-03-11
Attending: NURSE PRACTITIONER
Payer: MEDICAID

## 2024-03-11 VITALS — BODY MASS INDEX: 37.54 KG/M2 | WEIGHT: 225.31 LBS | HEIGHT: 65 IN

## 2024-03-11 DIAGNOSIS — N63.11 MASS OF UPPER OUTER QUADRANT OF RIGHT BREAST: ICD-10-CM

## 2024-03-11 DIAGNOSIS — R92.8 ABNORMAL MAMMOGRAM: ICD-10-CM

## 2024-03-11 PROCEDURE — 77062 BREAST TOMOSYNTHESIS BI: CPT | Mod: TC,PO

## 2024-06-04 ENCOUNTER — PATIENT MESSAGE (OUTPATIENT)
Dept: FAMILY MEDICINE | Facility: CLINIC | Age: 39
End: 2024-06-04
Payer: MEDICAID

## 2024-06-26 RX ORDER — PENICILLIN V POTASSIUM 500 MG/1
TABLET, FILM COATED ORAL
COMMUNITY
End: 2024-06-27 | Stop reason: ALTCHOICE

## 2024-06-26 RX ORDER — OXYCODONE AND ACETAMINOPHEN 5; 325 MG/1; MG/1
TABLET ORAL
COMMUNITY
End: 2024-06-27 | Stop reason: ALTCHOICE

## 2024-06-26 RX ORDER — TERCONAZOLE 8 MG/G
CREAM VAGINAL
COMMUNITY

## 2024-06-26 RX ORDER — ONDANSETRON 4 MG/1
TABLET, FILM COATED ORAL
COMMUNITY
End: 2024-06-27 | Stop reason: ALTCHOICE

## 2024-06-26 RX ORDER — PROMETHAZINE HYDROCHLORIDE 25 MG/1
TABLET ORAL
COMMUNITY
End: 2024-06-27 | Stop reason: ALTCHOICE

## 2024-06-26 RX ORDER — VALACYCLOVIR HYDROCHLORIDE 1 G/1
TABLET, FILM COATED ORAL
COMMUNITY

## 2024-06-27 ENCOUNTER — OFFICE VISIT (OUTPATIENT)
Dept: FAMILY MEDICINE | Facility: CLINIC | Age: 39
End: 2024-06-27
Payer: MEDICAID

## 2024-06-27 VITALS
HEIGHT: 65 IN | RESPIRATION RATE: 20 BRPM | WEIGHT: 226.63 LBS | HEART RATE: 80 BPM | BODY MASS INDEX: 37.76 KG/M2 | DIASTOLIC BLOOD PRESSURE: 80 MMHG | TEMPERATURE: 98 F | SYSTOLIC BLOOD PRESSURE: 120 MMHG

## 2024-06-27 DIAGNOSIS — B96.89 BV (BACTERIAL VAGINOSIS): ICD-10-CM

## 2024-06-27 DIAGNOSIS — R73.03 PREDIABETES: Primary | ICD-10-CM

## 2024-06-27 DIAGNOSIS — N76.0 BV (BACTERIAL VAGINOSIS): ICD-10-CM

## 2024-06-27 PROCEDURE — 1159F MED LIST DOCD IN RCRD: CPT | Mod: CPTII,,, | Performed by: NURSE PRACTITIONER

## 2024-06-27 PROCEDURE — 3079F DIAST BP 80-89 MM HG: CPT | Mod: CPTII,,, | Performed by: NURSE PRACTITIONER

## 2024-06-27 PROCEDURE — 3074F SYST BP LT 130 MM HG: CPT | Mod: CPTII,,, | Performed by: NURSE PRACTITIONER

## 2024-06-27 PROCEDURE — 99213 OFFICE O/P EST LOW 20 MIN: CPT | Mod: PBBFAC,PN | Performed by: NURSE PRACTITIONER

## 2024-06-27 PROCEDURE — 99213 OFFICE O/P EST LOW 20 MIN: CPT | Mod: S$PBB,,, | Performed by: NURSE PRACTITIONER

## 2024-06-27 PROCEDURE — 3008F BODY MASS INDEX DOCD: CPT | Mod: CPTII,,, | Performed by: NURSE PRACTITIONER

## 2024-06-27 PROCEDURE — 99999 PR PBB SHADOW E&M-EST. PATIENT-LVL III: CPT | Mod: PBBFAC,,, | Performed by: NURSE PRACTITIONER

## 2024-06-27 RX ORDER — BUPROPION HYDROCHLORIDE 150 MG/1
150 TABLET ORAL
COMMUNITY
Start: 2024-03-02

## 2024-06-27 RX ORDER — METRONIDAZOLE 500 MG/1
500 TABLET ORAL EVERY 12 HOURS
Qty: 14 TABLET | Refills: 0 | Status: SHIPPED | OUTPATIENT
Start: 2024-06-27 | End: 2024-07-04

## 2024-06-27 NOTE — PROGRESS NOTES
Patient ID: Manan Alaniz is a 39 y.o. female.    Chief Complaint: Follow-up (38 yo female here for weight recheck. Pt states no c/o. KM)    Ms. Alaniz presents today for follow up. She states she is doing well overall at this time. She has been started on tirzepatide by gynecology recently and she is currently on 2.5 mg. She states she  is tolerating the dose well. She will be titrated up soon. She is concerned because she has not lost weight yet so we discussed diet and exercise.     She states she also has been experiencing vaginal itching and burning. She has introduced several new soaps and lotions as of late and her pH balance is different. She is requesting medication to help.     She denies any acute issues or complaints.     Follow-up  Pertinent negatives include no arthralgias, chest pain, headaches, joint swelling, neck pain, vomiting or weakness.       Past Medical History:   Diagnosis Date    Herpes simplex virus (HSV) infection      Past Surgical History:   Procedure Laterality Date    DILATION AND CURETTAGE OF UTERUS           Tobacco History:  reports that she has never smoked. She has never been exposed to tobacco smoke. She has never used smokeless tobacco.      Review of patient's allergies indicates:  No Known Allergies    Current Outpatient Medications:     buPROPion (WELLBUTRIN XL) 150 MG TB24 tablet, Take 150 mg by mouth., Disp: , Rfl:     ergocalciferol (ERGOCALCIFEROL) 50,000 unit Cap, TAKE 1 CAPSULE BY MOUTH EVERY 7 DAYS, Disp: 4 capsule, Rfl: 2    metroNIDAZOLE (FLAGYL) 500 MG tablet, Take 1 tablet (500 mg total) by mouth every 12 (twelve) hours. for 7 days, Disp: 14 tablet, Rfl: 0    terconazole (TERAZOL 3) 0.8 % vaginal cream, , Disp: , Rfl:     valACYclovir (VALTREX) 1000 MG tablet, , Disp: , Rfl:     Review of Systems   Constitutional:  Negative for activity change and unexpected weight change.   HENT:  Negative for hearing loss, rhinorrhea and trouble swallowing.    Eyes:   "Negative for discharge and visual disturbance.   Respiratory:  Negative for chest tightness and wheezing.    Cardiovascular:  Negative for chest pain and palpitations.   Gastrointestinal:  Negative for blood in stool, constipation, diarrhea and vomiting.   Endocrine: Negative for polydipsia and polyuria.   Genitourinary:  Positive for vaginal pain. Negative for difficulty urinating, dysuria, hematuria and menstrual problem.   Musculoskeletal:  Negative for arthralgias, joint swelling and neck pain.   Neurological:  Negative for weakness and headaches.   Psychiatric/Behavioral:  Negative for confusion and dysphoric mood.           Objective:      Vitals:    06/27/24 1524   BP: 120/80   Pulse: 80   Resp: 20   Temp: 98.2 °F (36.8 °C)   TempSrc: Oral   Weight: 102.8 kg (226 lb 9.6 oz)   Height: 5' 5" (1.651 m)     Physical Exam  Constitutional:       Appearance: Normal appearance.   Cardiovascular:      Rate and Rhythm: Normal rate and regular rhythm.      Pulses: Normal pulses.      Heart sounds: Normal heart sounds.   Pulmonary:      Effort: Pulmonary effort is normal.      Breath sounds: Normal breath sounds.   Abdominal:      Palpations: Abdomen is soft.   Musculoskeletal:         General: Normal range of motion.   Neurological:      Mental Status: She is alert and oriented to person, place, and time.           Assessment:       1. Prediabetes    2. BV (bacterial vaginosis)           Plan:       Prediabetes  -     HEMOGLOBIN A1C; Future; Expected date: 06/27/2024  -     COMPREHENSIVE METABOLIC PANEL; Future; Expected date: 06/27/2024    BV (bacterial vaginosis)  -     metroNIDAZOLE (FLAGYL) 500 MG tablet; Take 1 tablet (500 mg total) by mouth every 12 (twelve) hours. for 7 days  Dispense: 14 tablet; Refill: 0      Follow up in about 6 months (around 12/27/2024).        6/27/2024 Delfina Spain NP      "

## 2024-07-23 ENCOUNTER — PATIENT MESSAGE (OUTPATIENT)
Dept: FAMILY MEDICINE | Facility: CLINIC | Age: 39
End: 2024-07-23
Payer: MEDICAID

## 2025-01-02 ENCOUNTER — PATIENT MESSAGE (OUTPATIENT)
Dept: ADMINISTRATIVE | Facility: HOSPITAL | Age: 40
End: 2025-01-02
Payer: MEDICAID

## 2025-01-03 ENCOUNTER — OFFICE VISIT (OUTPATIENT)
Dept: FAMILY MEDICINE | Facility: CLINIC | Age: 40
End: 2025-01-03
Payer: MEDICAID

## 2025-01-03 ENCOUNTER — LAB VISIT (OUTPATIENT)
Dept: LAB | Facility: HOSPITAL | Age: 40
End: 2025-01-03
Attending: NURSE PRACTITIONER
Payer: MEDICAID

## 2025-01-03 VITALS
HEIGHT: 65 IN | TEMPERATURE: 98 F | DIASTOLIC BLOOD PRESSURE: 84 MMHG | WEIGHT: 224.19 LBS | HEART RATE: 91 BPM | SYSTOLIC BLOOD PRESSURE: 124 MMHG | BODY MASS INDEX: 37.35 KG/M2 | OXYGEN SATURATION: 98 %

## 2025-01-03 DIAGNOSIS — E55.9 VITAMIN D DEFICIENCY: ICD-10-CM

## 2025-01-03 DIAGNOSIS — R73.03 PREDIABETES: Primary | ICD-10-CM

## 2025-01-03 DIAGNOSIS — R73.03 PREDIABETES: ICD-10-CM

## 2025-01-03 DIAGNOSIS — Z00.00 ROUTINE HEALTH MAINTENANCE: ICD-10-CM

## 2025-01-03 LAB
25(OH)D3+25(OH)D2 SERPL-MCNC: 8 NG/ML (ref 30–96)
ALBUMIN SERPL BCP-MCNC: 4 G/DL (ref 3.5–5.2)
ALP SERPL-CCNC: 77 U/L (ref 40–150)
ALT SERPL W/O P-5'-P-CCNC: 17 U/L (ref 10–44)
ANION GAP SERPL CALC-SCNC: 8 MMOL/L (ref 8–16)
AST SERPL-CCNC: 22 U/L (ref 10–40)
BASOPHILS # BLD AUTO: 0.02 K/UL (ref 0–0.2)
BASOPHILS NFR BLD: 0.4 % (ref 0–1.9)
BILIRUB SERPL-MCNC: 1 MG/DL (ref 0.1–1)
BUN SERPL-MCNC: 7 MG/DL (ref 6–20)
CALCIUM SERPL-MCNC: 9.1 MG/DL (ref 8.7–10.5)
CHLORIDE SERPL-SCNC: 107 MMOL/L (ref 95–110)
CO2 SERPL-SCNC: 24 MMOL/L (ref 23–29)
CREAT SERPL-MCNC: 0.8 MG/DL (ref 0.5–1.4)
DIFFERENTIAL METHOD BLD: ABNORMAL
EOSINOPHIL # BLD AUTO: 0.2 K/UL (ref 0–0.5)
EOSINOPHIL NFR BLD: 2.8 % (ref 0–8)
ERYTHROCYTE [DISTWIDTH] IN BLOOD BY AUTOMATED COUNT: 13.5 % (ref 11.5–14.5)
EST. GFR  (NO RACE VARIABLE): >60 ML/MIN/1.73 M^2
ESTIMATED AVG GLUCOSE: 114 MG/DL (ref 68–131)
GLUCOSE SERPL-MCNC: 86 MG/DL (ref 70–110)
HBA1C MFR BLD: 5.6 % (ref 4–5.6)
HCT VFR BLD AUTO: 44.5 % (ref 37–48.5)
HGB BLD-MCNC: 13.4 G/DL (ref 12–16)
IMM GRANULOCYTES # BLD AUTO: 0.02 K/UL (ref 0–0.04)
IMM GRANULOCYTES NFR BLD AUTO: 0.4 % (ref 0–0.5)
LYMPHOCYTES # BLD AUTO: 1.8 K/UL (ref 1–4.8)
LYMPHOCYTES NFR BLD: 32.3 % (ref 18–48)
MCH RBC QN AUTO: 27.6 PG (ref 27–31)
MCHC RBC AUTO-ENTMCNC: 30.1 G/DL (ref 32–36)
MCV RBC AUTO: 92 FL (ref 82–98)
MONOCYTES # BLD AUTO: 0.5 K/UL (ref 0.3–1)
MONOCYTES NFR BLD: 8.5 % (ref 4–15)
NEUTROPHILS # BLD AUTO: 3.1 K/UL (ref 1.8–7.7)
NEUTROPHILS NFR BLD: 55.6 % (ref 38–73)
NRBC BLD-RTO: 0 /100 WBC
PLATELET # BLD AUTO: 219 K/UL (ref 150–450)
PMV BLD AUTO: 11.2 FL (ref 9.2–12.9)
POTASSIUM SERPL-SCNC: 4.1 MMOL/L (ref 3.5–5.1)
PROT SERPL-MCNC: 8.5 G/DL (ref 6–8.4)
RBC # BLD AUTO: 4.86 M/UL (ref 4–5.4)
SODIUM SERPL-SCNC: 139 MMOL/L (ref 136–145)
WBC # BLD AUTO: 5.63 K/UL (ref 3.9–12.7)

## 2025-01-03 PROCEDURE — 99999 PR PBB SHADOW E&M-EST. PATIENT-LVL III: CPT | Mod: PBBFAC,,, | Performed by: NURSE PRACTITIONER

## 2025-01-03 PROCEDURE — 85025 COMPLETE CBC W/AUTO DIFF WBC: CPT | Performed by: NURSE PRACTITIONER

## 2025-01-03 PROCEDURE — 82306 VITAMIN D 25 HYDROXY: CPT | Performed by: NURSE PRACTITIONER

## 2025-01-03 PROCEDURE — 99213 OFFICE O/P EST LOW 20 MIN: CPT | Mod: PBBFAC,PN | Performed by: NURSE PRACTITIONER

## 2025-01-03 PROCEDURE — 36415 COLL VENOUS BLD VENIPUNCTURE: CPT | Performed by: NURSE PRACTITIONER

## 2025-01-03 PROCEDURE — 83036 HEMOGLOBIN GLYCOSYLATED A1C: CPT | Performed by: NURSE PRACTITIONER

## 2025-01-03 PROCEDURE — 80053 COMPREHEN METABOLIC PANEL: CPT | Performed by: NURSE PRACTITIONER

## 2025-01-03 RX ORDER — VALACYCLOVIR HYDROCHLORIDE 500 MG/1
500 TABLET, FILM COATED ORAL 2 TIMES DAILY
COMMUNITY
Start: 2024-11-05

## 2025-01-03 RX ORDER — ERGOCALCIFEROL 1.25 MG/1
50000 CAPSULE ORAL
Qty: 4 CAPSULE | Refills: 2 | Status: SHIPPED | OUTPATIENT
Start: 2025-01-03

## 2025-01-03 NOTE — PROGRESS NOTES
Patient ID: Manan Alaniz is a 39 y.o. female.    Chief Complaint: Follow-up (6 month f/u)    History of Present Illness    CHIEF COMPLAINT:  Ms. Alaniz presents with symptoms of an upper respiratory infection, including a sore throat and possible postnasal drip.    HPI:  Ms. Alaniz reports throat pain, particularly when waking, which improves throughout the day. She describes mild throat discomfort and feeling feverish. Postnasal drip is mentioned. Ms. Alaniz's daughter recently coughed in her face, which may be related to the onset of these symptoms. No specific treatments have been initiated yet. Ms. Alaniz denies ear pain or visible issues with the ears.    MEDICATIONS:  Ms. Alaniz reports having Vitamin D but is not taking it consistently.    MEDICAL HISTORY:  Ms. Alaniz has a history of Polycystic ovary syndrome (PCOS). Ms. Alaniz's last Pap smear was performed by Dr. Sosa. She reports post-procedure bleeding and pain. Her obstetric history includes at least five pregnancies (G5) and five deliveries (P5). She has at least three children, including a 2-year-old, a 5-year-old, and a 6-year-old.    TEST RESULTS:  Ms. Alaniz has a history of low Vitamin D levels. A sleep study has been identified as a potential future need for the patient. She had a past Pap smear performed by Dr. Sosa, after which she reported bleeding and discomfort.    SOCIAL HISTORY:  Ms. Alaniz works as a  at [employer] for 1 year.      ROS:  General: -fever, -chills, -fatigue, -weight gain, -weight loss  Eyes: -vision changes, -redness, -discharge  ENT: -ear pain, -nasal congestion, +sore throat  Cardiovascular: -chest pain, -palpitations, -lower extremity edema  Respiratory: -cough, -shortness of breath  Gastrointestinal: -abdominal pain, -nausea, -vomiting, -diarrhea, -constipation, -blood in stool  Genitourinary: -dysuria, -hematuria, -frequency, -pelvic pain  Musculoskeletal: -joint  "pain, -muscle pain  Skin: -rash, -lesion  Neurological: -headache, -dizziness, -numbness, -tingling  Psychiatric: -anxiety, -depression, -sleep difficulty             Past Medical History:   Diagnosis Date    Herpes simplex virus (HSV) infection      Past Surgical History:   Procedure Laterality Date    DILATION AND CURETTAGE OF UTERUS           Tobacco History:  reports that she has never smoked. She has never been exposed to tobacco smoke. She has never used smokeless tobacco.      Review of patient's allergies indicates:  No Known Allergies    Current Outpatient Medications:     buPROPion (WELLBUTRIN XL) 150 MG TB24 tablet, Take 150 mg by mouth., Disp: , Rfl:     valACYclovir (VALTREX) 500 MG tablet, Take 500 mg by mouth 2 (two) times daily., Disp: , Rfl:     ergocalciferol (ERGOCALCIFEROL) 50,000 unit Cap, Take 1 capsule (50,000 Units total) by mouth every 7 days., Disp: 4 capsule, Rfl: 2           Objective:      Vitals:    01/03/25 1045   BP: 124/84   Pulse: 91   Temp: 98.2 °F (36.8 °C)   TempSrc: Oral   SpO2: 98%   Weight: 101.7 kg (224 lb 3.2 oz)   Height: 5' 5" (1.651 m)     Physical Exam  Constitutional:       Appearance: Normal appearance. She is obese.   Cardiovascular:      Rate and Rhythm: Normal rate and regular rhythm.      Pulses: Normal pulses.      Heart sounds: Normal heart sounds.   Pulmonary:      Effort: Pulmonary effort is normal.      Breath sounds: Normal breath sounds.   Abdominal:      General: Bowel sounds are normal.      Palpations: Abdomen is soft.   Musculoskeletal:         General: Normal range of motion.   Skin:     General: Skin is warm.   Neurological:      Mental Status: She is alert and oriented to person, place, and time. Mental status is at baseline.   Psychiatric:         Mood and Affect: Mood normal.         Behavior: Behavior normal.         Thought Content: Thought content normal.           Assessment:       1. Prediabetes    2. Vitamin D deficiency    3. Routine health " maintenance           Plan:       Assessment & Plan    IMPRESSION:  - Assessed patient's reported upper respiratory symptoms, including sore throat and possible postnasal drip  - Performed physical exam; found no significant abnormalities  - Determined symptoms likely due to viral infection, manageable with OTC treatments  - Reviewed need for vitamin D supplementation  - Considered updating Pap smear results from Dr. Sosa    UPPER RESPIRATORY INFECTION:  - Ms. Alaniz reports coughing.  - Assessed the condition as part of an upper respiratory issue.  - Recommend OTC allergy medication for symptom management.  - Advised the patient to use OTC treatments for the viral issue.  - Ms. Alaniz reports throat pain upon waking up, which improves throughout the day.  - Examined the patient's throat.  - Assessed the condition as part of an upper respiratory issue.  - Recommend OTC treatment for the viral issue.    VITAMIN D DEFICIENCY:  - Ms. Alaniz admits to not taking prescribed Vitamin D.  - Acknowledged the need for Vitamin D supplementation.  - Refilled the Vitamin D supplement prescription.    PAP SMEAR:  - Ms. Alaniz mentions previous Pap smear experience.  - Plan to review previous Pap smear results.    OTHER INSTRUCTIONS:  - [facility] for [facility] medication.         Prediabetes  -     HEMOGLOBIN A1C; Future; Expected date: 01/03/2025    Vitamin D deficiency  -     Vitamin D; Future; Expected date: 01/03/2025  -     ergocalciferol (ERGOCALCIFEROL) 50,000 unit Cap; Take 1 capsule (50,000 Units total) by mouth every 7 days.  Dispense: 4 capsule; Refill: 2    Routine health maintenance  -     CBC W/ AUTO DIFFERENTIAL; Future; Expected date: 01/03/2025  -     COMPREHENSIVE METABOLIC PANEL; Future; Expected date: 01/03/2025  -     HEMOGLOBIN A1C; Future; Expected date: 01/03/2025  -     Vitamin D; Future; Expected date: 01/03/2025      Follow up  in about 6 months (around 7/3/2025).        1/3/2025 Delfina Spain NP    This note was generated with the assistance of ambient listening technology. Verbal consent was obtained by the patient and accompanying visitor(s) for the recording of patient appointment to facilitate this note. I attest to having reviewed and edited the generated note for accuracy, though some syntax or spelling errors may persist. Please contact the author of this note for any clarification.        Answers submitted by the patient for this visit:  Review of Systems Questionnaire (Submitted on 12/31/2024)  activity change: No  unexpected weight change: No  neck pain: No  hearing loss: No  rhinorrhea: No  trouble swallowing: No  eye discharge: No  visual disturbance: No  chest tightness: No  wheezing: No  chest pain: Yes  palpitations: No  blood in stool: No  constipation: No  vomiting: No  diarrhea: No  polydipsia: No  polyuria: Yes  difficulty urinating: No  hematuria: No  menstrual problem: Yes  dysuria: No  joint swelling: No  arthralgias: No  headaches: No  weakness: No  confusion: No  dysphoric mood: No

## 2025-01-06 ENCOUNTER — PATIENT MESSAGE (OUTPATIENT)
Dept: FAMILY MEDICINE | Facility: CLINIC | Age: 40
End: 2025-01-06
Payer: MEDICAID

## 2025-03-07 ENCOUNTER — PATIENT MESSAGE (OUTPATIENT)
Dept: FAMILY MEDICINE | Facility: CLINIC | Age: 40
End: 2025-03-07
Payer: MEDICAID

## 2025-03-10 DIAGNOSIS — R94.31 ABNORMAL EKG: Primary | ICD-10-CM

## 2025-03-14 ENCOUNTER — LAB VISIT (OUTPATIENT)
Dept: LAB | Facility: HOSPITAL | Age: 40
End: 2025-03-14
Attending: NURSE PRACTITIONER
Payer: MEDICAID

## 2025-03-14 DIAGNOSIS — R94.31 ABNORMAL EKG: ICD-10-CM

## 2025-03-14 PROCEDURE — 93005 ELECTROCARDIOGRAM TRACING: CPT | Mod: PO | Performed by: INTERNAL MEDICINE

## 2025-03-14 PROCEDURE — 93010 ELECTROCARDIOGRAM REPORT: CPT | Mod: ,,, | Performed by: INTERNAL MEDICINE

## 2025-03-16 LAB
OHS QRS DURATION: 70 MS
OHS QTC CALCULATION: 428 MS

## 2025-03-17 ENCOUNTER — RESULTS FOLLOW-UP (OUTPATIENT)
Dept: FAMILY MEDICINE | Facility: CLINIC | Age: 40
End: 2025-03-17

## 2025-04-03 ENCOUNTER — PATIENT MESSAGE (OUTPATIENT)
Dept: ADMINISTRATIVE | Facility: HOSPITAL | Age: 40
End: 2025-04-03
Payer: MEDICAID

## 2025-04-11 ENCOUNTER — OFFICE VISIT (OUTPATIENT)
Dept: FAMILY MEDICINE | Facility: CLINIC | Age: 40
End: 2025-04-11
Payer: MEDICAID

## 2025-04-11 VITALS
DIASTOLIC BLOOD PRESSURE: 70 MMHG | HEIGHT: 65 IN | BODY MASS INDEX: 37.47 KG/M2 | TEMPERATURE: 99 F | WEIGHT: 224.88 LBS | RESPIRATION RATE: 20 BRPM | HEART RATE: 80 BPM | SYSTOLIC BLOOD PRESSURE: 126 MMHG

## 2025-04-11 DIAGNOSIS — F32.A ANXIETY AND DEPRESSION: Primary | ICD-10-CM

## 2025-04-11 DIAGNOSIS — F41.9 ANXIETY AND DEPRESSION: Primary | ICD-10-CM

## 2025-04-11 PROCEDURE — 99213 OFFICE O/P EST LOW 20 MIN: CPT | Mod: PBBFAC,PN | Performed by: NURSE PRACTITIONER

## 2025-04-11 PROCEDURE — 99999 PR PBB SHADOW E&M-EST. PATIENT-LVL III: CPT | Mod: PBBFAC,,, | Performed by: NURSE PRACTITIONER

## 2025-04-11 RX ORDER — ESCITALOPRAM OXALATE 10 MG/1
10 TABLET ORAL DAILY
Qty: 90 TABLET | Refills: 3 | Status: SHIPPED | OUTPATIENT
Start: 2025-04-11 | End: 2026-04-11

## 2025-04-11 NOTE — PROGRESS NOTES
Patient ID: Manan Alaniz is a 39 y.o. female.    Chief Complaint: Follow-up (38 yo female here for recheck. Pt states she was eval/tx in Urgent Care times 1 month ago due to chest pain and light headed. Pt states EKG normal. KM)    History of Present Illness    CHIEF COMPLAINT:  Ms. Alaniz presents with anxiety and depression, seeking potential medication adjustment and discussing work-related stress.    HPI:  Ms. Alaniz reports anxiety and depression. 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 She discontinued Wellbutrin about a week ago. Around this time, she had chest pain, shortness of breath, and generally felt unwell, though the direct relationship between these symptoms and stopping the medication is unclear. She was evaluated at an urgent care facility for these symptoms.    Her anxiety is exacerbated by work-related stress and concerns about childcare during the upcoming summer break. She is considering leaving her job due to the challenges of balancing work and childcare responsibilities. She also mentions stress related to a recent family loss, which affected her ability to study for a professional licensing exam.    She has a history of taking Escitalopram for her anxiety and depression, which she reports was effective in the past. She is considering whether she should restart medication to manage her symptoms.    MEDICATIONS:  Ms. Alaniz recently discontinued Wellbutrin and was previously on Escitalopram.    MEDICAL HISTORY:  Ms. Alaniz has a history of anxiety and depression.    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    Marital status:       ROS:  General: -fever, -chills, -fatigue, -weight gain, -weight loss, +malaise  Eyes: -vision changes, -redness, -discharge  ENT: -ear pain, -nasal congestion, -sore throat  Cardiovascular: +chest pain, -palpitations, -lower extremity edema  Respiratory: -cough, +shortness of  "breath  Gastrointestinal: +abdominal pain, -nausea, -vomiting, -diarrhea, -constipation, -blood in stool  Genitourinary: -dysuria, -hematuria, -frequency  Musculoskeletal: -joint pain, -muscle pain  Skin: -rash, -lesion  Neurological: -headache, -dizziness, -numbness, -tingling  Psychiatric: +anxiety, +depression, -sleep difficulty             Past Medical History:   Diagnosis Date    Herpes simplex virus (HSV) infection      Past Surgical History:   Procedure Laterality Date    DILATION AND CURETTAGE OF UTERUS           Tobacco History:  reports that she has never smoked. She has never been exposed to tobacco smoke. She has never used smokeless tobacco.      Review of patient's allergies indicates:  No Known Allergies  Current Medications[1]           Objective:      Vitals:    04/11/25 0904   BP: 126/70   Pulse: 80   Resp: 20   Temp: 98.7 °F (37.1 °C)   TempSrc: Oral   Weight: 102 kg (224 lb 13.9 oz)   Height: 5' 5" (1.651 m)     Physical Exam  Constitutional:       Appearance: Normal appearance. She is obese.   Cardiovascular:      Rate and Rhythm: Normal rate and regular rhythm.      Pulses: Normal pulses.      Heart sounds: Normal heart sounds.   Pulmonary:      Effort: Pulmonary effort is normal.      Breath sounds: Normal breath sounds.   Abdominal:      General: Bowel sounds are normal.      Palpations: Abdomen is soft.   Musculoskeletal:         General: Normal range of motion.   Skin:     General: Skin is warm.   Neurological:      Mental Status: She is alert and oriented to person, place, and time. Mental status is at baseline.   Psychiatric:         Mood and Affect: Mood normal.         Behavior: Behavior normal.         Thought Content: Thought content normal.           Assessment:       1. Anxiety and depression           Plan:       Assessment & Plan    F32.9 Major depressive disorder, single episode, unspecified  F41.9 Anxiety disorder, unspecified  R07.9 Chest pain, unspecified  R06.02 Shortness of " breath  Z56.3 Stressful work schedule  Z56.6 Other physical and mental strain related to work  Z63.0 Problems in relationship with spouse or partner    IMPRESSION:  - Considered restarting antidepressant medication for anxiety and depression, ruled out Wellbutrin due to previous side effects.  - Planning to restart antidepressant medication, potentially escitalopram, at lowest dose (specific dosage to be determined).    DEPRESSION AND ANXIETY:  - Referred the patient to a psychiatrist for evaluation of depression.  - Ms. Alaniz reports feeling anxious and overwhelmed with work and personal responsibilities.  - Noted that the patient stopped taking medication for anxiety and depression.  - Observed signs of stress and anxiety related to work and childcare responsibilities.  - Acknowledged the patient's need for medication and suggested trying a different medication.  - Planned to restart medication and submit a referral for further evaluation.  - Referred the patient to a psychiatrist for evaluation of anxiety.  - Ms. Alaniz reports feeling constantly anxious due to work and personal responsibilities.  - Acknowledged the patient's anxiety and suggested the need for medication.  - Planned to restart medication and submit a referral for further evaluation of anxiety.    CHEST PAIN:  - Ms. Alaniz reports experiencing chest pain in the past.  - Noted that the patient had chest pain along with dyspnea in the past.  - Acknowledged past chest pain but uncertain about its relation to medication.  - Advised the patient to seek urgent care when experiencing chest pain.    SHORTNESS OF BREATH:  - Ms. Alaniz reports experiencing dyspnea in the past.  - Noted that the patient had dyspnea along with chest pain in the past.  - Advised the patient to seek urgent care when experiencing dyspnea.    STRESSFUL WORK SCHEDULE:  - Ms. Alaniz reports having a stressful work schedule with numerous clients and  responsibilities.  - Observed that the patient expresses feeling overwhelmed and anxious due to work responsibilities.  - Acknowledged the patient's stressful work situation and suggested strategizing.  - Recommend considering part-time work or reduced hours to manage stress.  - Scheduled follow-up to discuss next steps if deciding to pursue leave of absence from work.    WORK-RELATED MENTAL STRAIN:  - Ms. Alaniz reports feeling mental strain and anxiety related to work responsibilities.  - Observed signs of mental strain and noted that the patient considers quitting due to work-related stress.  - Acknowledged the mental strain related to work and suggested strategizing to manage it.  - Recommend considering part-time work or reduced hours to manage work-related strain.    RELATIONSHIP ISSUES:  - Ms. Alaniz mentions issues with their partner regarding childcare responsibilities.         Anxiety and depression  -     EScitalopram oxalate (LEXAPRO) 10 MG tablet; Take 1 tablet (10 mg total) by mouth once daily.  Dispense: 90 tablet; Refill: 3  -     Ambulatory referral/consult to Psychiatry; Future; Expected date: 04/18/2025      Follow up in about 6 months (around 10/11/2025).        4/11/2025 Delfina Spain NP    This note was generated with the assistance of ambient listening technology. Verbal consent was obtained by the patient and accompanying visitor(s) for the recording of patient appointment to facilitate this note. I attest to having reviewed and edited the generated note for accuracy, though some syntax or spelling errors may persist. Please contact the author of this note for any clarification.        Answers submitted by the patient for this visit:  Review of Systems Questionnaire (Submitted on 4/10/2025)  activity change: No  unexpected weight change: No  neck pain: No  hearing loss: No  rhinorrhea: No  trouble swallowing: No  eye discharge: No  visual disturbance: No  chest tightness:  No  wheezing: No  chest pain: No  palpitations: No  blood in stool: No  constipation: No  vomiting: No  diarrhea: No  polydipsia: No  polyuria: No  difficulty urinating: No  hematuria: No  menstrual problem: No  dysuria: No  joint swelling: No  arthralgias: No  headaches: No  weakness: No  confusion: No  dysphoric mood: No         [1]   Current Outpatient Medications:     ergocalciferol (ERGOCALCIFEROL) 50,000 unit Cap, Take 1 capsule (50,000 Units total) by mouth every 7 days., Disp: 4 capsule, Rfl: 2    valACYclovir (VALTREX) 500 MG tablet, Take 500 mg by mouth 2 (two) times daily., Disp: , Rfl:     EScitalopram oxalate (LEXAPRO) 10 MG tablet, Take 1 tablet (10 mg total) by mouth once daily., Disp: 90 tablet, Rfl: 3

## 2025-04-14 ENCOUNTER — OFFICE VISIT (OUTPATIENT)
Dept: FAMILY MEDICINE | Facility: CLINIC | Age: 40
End: 2025-04-14
Payer: MEDICAID

## 2025-04-14 VITALS
HEIGHT: 65 IN | TEMPERATURE: 99 F | BODY MASS INDEX: 37.1 KG/M2 | WEIGHT: 222.69 LBS | DIASTOLIC BLOOD PRESSURE: 80 MMHG | SYSTOLIC BLOOD PRESSURE: 128 MMHG | RESPIRATION RATE: 20 BRPM | HEART RATE: 108 BPM

## 2025-04-14 DIAGNOSIS — J02.9 SORE THROAT: ICD-10-CM

## 2025-04-14 DIAGNOSIS — R51.9 GENERALIZED HEADACHES: ICD-10-CM

## 2025-04-14 DIAGNOSIS — R09.81 SINUS CONGESTION: ICD-10-CM

## 2025-04-14 DIAGNOSIS — J02.0 STREP PHARYNGITIS: Primary | ICD-10-CM

## 2025-04-14 LAB
CTP QC/QA: YES
CTP QC/QA: YES
MOLECULAR STREP A: POSITIVE
SARS-COV-2 RDRP RESP QL NAA+PROBE: NEGATIVE

## 2025-04-14 PROCEDURE — 99999PBSHW POCT STREP A MOLECULAR: Mod: PBBFAC,,,

## 2025-04-14 PROCEDURE — 99999 PR PBB SHADOW E&M-EST. PATIENT-LVL III: CPT | Mod: PBBFAC,,, | Performed by: NURSE PRACTITIONER

## 2025-04-14 PROCEDURE — 87651 STREP A DNA AMP PROBE: CPT | Mod: PBBFAC,PN | Performed by: NURSE PRACTITIONER

## 2025-04-14 PROCEDURE — 3079F DIAST BP 80-89 MM HG: CPT | Mod: CPTII,,, | Performed by: NURSE PRACTITIONER

## 2025-04-14 PROCEDURE — 99999PBSHW: Mod: PBBFAC,,,

## 2025-04-14 PROCEDURE — 99213 OFFICE O/P EST LOW 20 MIN: CPT | Mod: PBBFAC,PN | Performed by: NURSE PRACTITIONER

## 2025-04-14 PROCEDURE — 87635 SARS-COV-2 COVID-19 AMP PRB: CPT | Mod: PBBFAC,PN | Performed by: NURSE PRACTITIONER

## 2025-04-14 PROCEDURE — 1159F MED LIST DOCD IN RCRD: CPT | Mod: CPTII,,, | Performed by: NURSE PRACTITIONER

## 2025-04-14 PROCEDURE — 3008F BODY MASS INDEX DOCD: CPT | Mod: CPTII,,, | Performed by: NURSE PRACTITIONER

## 2025-04-14 PROCEDURE — 3044F HG A1C LEVEL LT 7.0%: CPT | Mod: CPTII,,, | Performed by: NURSE PRACTITIONER

## 2025-04-14 PROCEDURE — 99213 OFFICE O/P EST LOW 20 MIN: CPT | Mod: S$PBB,,, | Performed by: NURSE PRACTITIONER

## 2025-04-14 PROCEDURE — 3074F SYST BP LT 130 MM HG: CPT | Mod: CPTII,,, | Performed by: NURSE PRACTITIONER

## 2025-04-14 RX ORDER — AMOXICILLIN 500 MG/1
500 TABLET, FILM COATED ORAL EVERY 12 HOURS
Qty: 20 TABLET | Refills: 0 | Status: SHIPPED | OUTPATIENT
Start: 2025-04-14 | End: 2025-04-24

## 2025-04-14 NOTE — PROGRESS NOTES
" Patient ID: Manan Alaniz is a 39 y.o. female.    Chief Complaint: Sore Throat (40 yo female here c/o sore throat, sx congestion and headaches times 3 days. No report of fever or chills. KM)    Ms. Alaniz presents today for sick visit. She was seen well on Friday so this is a focused visit. She states her symptoms started on Friday.     Sore Throat   This is a new problem. The current episode started in the past 7 days. The problem has been waxing and waning. Neither side of throat is experiencing more pain than the other. There has been no fever. Associated symptoms include congestion, ear pain, a hoarse voice, a plugged ear sensation, neck pain and swollen glands. She has had exposure to strep. She has had no exposure to mono. She has tried cool liquids and acetaminophen for the symptoms. The treatment provided no relief.     Past Medical History:   Diagnosis Date    Herpes simplex virus (HSV) infection      Past Surgical History:   Procedure Laterality Date    DILATION AND CURETTAGE OF UTERUS           Tobacco History:  reports that she has never smoked. She has never been exposed to tobacco smoke. She has never used smokeless tobacco.      Review of patient's allergies indicates:  No Known Allergies  Current Medications[1]    Review of Systems   HENT:  Positive for congestion, ear pain, hoarse voice and sore throat.    Musculoskeletal:  Positive for neck pain.          Objective:      Vitals:    04/14/25 1151   BP: 128/80   Pulse: 108   Resp: 20   Temp: 99.2 °F (37.3 °C)   TempSrc: Oral   Weight: 101 kg (222 lb 10.6 oz)   Height: 5' 5" (1.651 m)     Physical Exam  Constitutional:       Appearance: She is ill-appearing.   HENT:      Mouth/Throat:      Pharynx: Posterior oropharyngeal erythema present.   Cardiovascular:      Rate and Rhythm: Normal rate and regular rhythm.   Neurological:      Mental Status: She is alert and oriented to person, place, and time.   Psychiatric:         Mood and Affect: " Mood normal.         Behavior: Behavior normal.           Assessment:       1. Strep pharyngitis    2. Sore throat    3. Sinus congestion    4. Generalized headaches           Plan:       Strep pharyngitis  -     amoxicillin (AMOXIL) 500 MG Tab; Take 1 tablet (500 mg total) by mouth every 12 (twelve) hours. for 10 days  Dispense: 20 tablet; Refill: 0    Sore throat  -     POCT Strep A, Molecular  -     POCT COVID-19 Rapid Screening  -     amoxicillin (AMOXIL) 500 MG Tab; Take 1 tablet (500 mg total) by mouth every 12 (twelve) hours. for 10 days  Dispense: 20 tablet; Refill: 0    Sinus congestion  -     POCT Strep A, Molecular  -     POCT COVID-19 Rapid Screening    Generalized headaches  -     POCT Strep A, Molecular  -     POCT COVID-19 Rapid Screening      Follow up if symptoms worsen or fail to improve.        4/14/2025 Delfina Spain NP           [1]   Current Outpatient Medications:     ergocalciferol (ERGOCALCIFEROL) 50,000 unit Cap, Take 1 capsule (50,000 Units total) by mouth every 7 days., Disp: 4 capsule, Rfl: 2    EScitalopram oxalate (LEXAPRO) 10 MG tablet, Take 1 tablet (10 mg total) by mouth once daily., Disp: 90 tablet, Rfl: 3    valACYclovir (VALTREX) 500 MG tablet, Take 500 mg by mouth 2 (two) times daily., Disp: , Rfl:     amoxicillin (AMOXIL) 500 MG Tab, Take 1 tablet (500 mg total) by mouth every 12 (twelve) hours. for 10 days, Disp: 20 tablet, Rfl: 0

## 2025-08-04 ENCOUNTER — TELEPHONE (OUTPATIENT)
Dept: FAMILY MEDICINE | Facility: CLINIC | Age: 40
End: 2025-08-04
Payer: MEDICAID